# Patient Record
Sex: MALE | Race: WHITE | NOT HISPANIC OR LATINO | Employment: PART TIME | URBAN - METROPOLITAN AREA
[De-identification: names, ages, dates, MRNs, and addresses within clinical notes are randomized per-mention and may not be internally consistent; named-entity substitution may affect disease eponyms.]

---

## 2024-04-09 ENCOUNTER — HOSPITAL ENCOUNTER (EMERGENCY)
Facility: CLINIC | Age: 24
Discharge: HOME | End: 2024-04-09
Attending: EMERGENCY MEDICINE | Admitting: EMERGENCY MEDICINE

## 2024-04-09 ENCOUNTER — APPOINTMENT (OUTPATIENT)
Dept: GENERAL RADIOLOGY | Facility: CLINIC | Age: 24
End: 2024-04-09
Attending: EMERGENCY MEDICINE

## 2024-04-09 VITALS
SYSTOLIC BLOOD PRESSURE: 154 MMHG | RESPIRATION RATE: 16 BRPM | HEART RATE: 85 BPM | TEMPERATURE: 98.2 F | OXYGEN SATURATION: 98 % | DIASTOLIC BLOOD PRESSURE: 93 MMHG

## 2024-04-09 DIAGNOSIS — S93.401A SPRAIN OF RIGHT ANKLE, UNSPECIFIED LIGAMENT, INITIAL ENCOUNTER: ICD-10-CM

## 2024-04-09 PROCEDURE — 99283 EMERGENCY DEPT VISIT LOW MDM: CPT | Performed by: EMERGENCY MEDICINE

## 2024-04-09 PROCEDURE — 73610 X-RAY EXAM OF ANKLE: CPT | Mod: RT

## 2024-04-09 PROCEDURE — 73630 X-RAY EXAM OF FOOT: CPT | Mod: RT

## 2024-04-09 PROCEDURE — 73562 X-RAY EXAM OF KNEE 3: CPT | Mod: RT

## 2024-04-09 RX ORDER — CYCLOBENZAPRINE HCL 10 MG
10 TABLET ORAL 3 TIMES DAILY PRN
Qty: 20 TABLET | Refills: 0 | Status: SHIPPED | OUTPATIENT
Start: 2024-04-09 | End: 2024-04-15

## 2024-04-09 RX ORDER — CYCLOBENZAPRINE HCL 10 MG
10 TABLET ORAL 3 TIMES DAILY PRN
Qty: 10 TABLET | Refills: 0 | Status: SHIPPED | OUTPATIENT
Start: 2024-04-09

## 2024-04-09 ASSESSMENT — COLUMBIA-SUICIDE SEVERITY RATING SCALE - C-SSRS
2. HAVE YOU ACTUALLY HAD ANY THOUGHTS OF KILLING YOURSELF IN THE PAST MONTH?: NO
1. IN THE PAST MONTH, HAVE YOU WISHED YOU WERE DEAD OR WISHED YOU COULD GO TO SLEEP AND NOT WAKE UP?: NO
6. HAVE YOU EVER DONE ANYTHING, STARTED TO DO ANYTHING, OR PREPARED TO DO ANYTHING TO END YOUR LIFE?: NO

## 2024-04-09 ASSESSMENT — ACTIVITIES OF DAILY LIVING (ADL)
ADLS_ACUITY_SCORE: 33
ADLS_ACUITY_SCORE: 35
ADLS_ACUITY_SCORE: 35
ADLS_ACUITY_SCORE: 33

## 2024-04-09 NOTE — LETTER
April 9, 2024      To Whom It May Concern:      López Moore was seen in our Emergency Department today, 04/09/24.  He has an ankle sprain and may need to use crutches. He should be assigned light duty and no standing for more than 1 hour at a time, please accommodate 10-15 min breaks as needed. These restrictions are in effect for one week from today. Any further restrictions, if necessary, will need to come from his clinic.    Sincerely,        Emili Jara MD

## 2024-04-09 NOTE — ED PROVIDER NOTES
Patient was signed out to me at change of shift by Dr. Lr, please see her note for full details.  Briefly, at change of shift, we were pending right foot x-ray, right ankle x-ray, and right knee x-ray.    These x-rays were read by radiology as negative for fracture.  Patient was given a gel splint and crutches for ankle sprain as he is having difficulty bearing weight.  Discussed rest, ice, elevation, NSAIDs.  Dr. Lr also provided a prescription for Flexeril due to some muscle spasm/pain that he was having.  Typical precautions discussed.  Patient verbalizes understanding.     Emili Jara MD  04/09/24 [id number]

## 2024-04-09 NOTE — DISCHARGE INSTRUCTIONS
TODAY'S VISIT:  You were seen today for ankle pain   - The radiologist has looked at the X rays and there is no sign of any broken bones. You have an ankle sprain. We recommend ice, elevation, and continued use of motrin. You can use the muscle relaxer as needed for muscle pain. This medicine can make you sleepy, so you cannot drink alcohol or drive while on this medicine.   - If you had any labs or imaging/radiology tests performed today, you should also discuss these tests with your usual provider.     FOLLOW-UP:  Please make an appointment to follow up with:  - Your Primary Care Provider. If you do not have a PCP, please call the [facility] (phone: [phone] for an appointment   - Please make an appointment to follow up with Orthopedics Clinic (phone: [phone]) if symptoms persist    - Have your provider review the results from today's visit with you again to make sure no further follow-up or additional testing is needed based on those results.     RETURN TO THE EMERGENCY DEPARTMENT  Return to the Emergency Department at any time for any new or worsening symptoms or any concerns.

## 2024-04-09 NOTE — ED PROVIDER NOTES
History     Chief Complaint   Patient presents with    Ankle Pain     Onset 2 days ago stepped off a curb twisted right ankle, increased swelling, continues to have pain with movement.     HPI  López Moore is a 23 year old otherwise healthy male who presents to the emergency department with a chief complaint of right ankle pain.  The patient states 2 days ago he stepped off a curb and twisted his right ankle (inverting it).  Since then, he has had pain to the lateral and posterior aspects of his ankle, these worsen when he attempts to ambulate.  He has been able to ambulate, but feels as if he cannot put his full weight on the affected extremity.  He also reports some associated knee and posterior calf discomfort with this.  Distal sensation is intact in his foot, though movement is somewhat limited due to swelling.  No other associated injuries reported.  He is not on blood thinners.  The patient does note he has a previous meniscus injury on the right.    The patient has been using Tylenol/ibuprofen at home for pain control as well as resting and elevating his foot is much as possible.    I have reviewed the Medications, Allergies, Past Medical and Surgical History, and Social History in the Epic system.    No past medical history on file.  No past surgical history on file.  No current facility-administered medications for this encounter.     No current outpatient medications on file.     No Known Allergies  Past medical history, past surgical history, medications, and allergies were reviewed with the patient. Additional pertinent items: None    Social History     Socioeconomic History    Marital status: Single     Spouse name: Not on file    Number of children: Not on file    Years of education: Not on file    Highest education level: Not on file   Occupational History    Not on file   Tobacco Use    Smoking status: Not on file    Smokeless tobacco: Not on file   Substance and Sexual Activity    Alcohol  use: Not on file    Drug use: Not on file    Sexual activity: Not on file   Other Topics Concern    Not on file   Social History Narrative    Not on file     Social Determinants of Health     Financial Resource Strain: Not on file   Food Insecurity: Not on file   Transportation Needs: Not on file   Physical Activity: Not on file   Stress: Not on file   Social Connections: Not on file   Interpersonal Safety: Not on file   Housing Stability: Not on file     Social history was reviewed with the patient. Additional pertinent items: None    Review of Systems  A medically appropriate review of systems was performed with pertinent positives and negatives noted in the HPI, and all other systems negative.    Physical Exam   BP: (!) 154/93  Pulse: 85  Temp: 98.2  F (36.8  C)  Resp: 16  SpO2: 98 %      General: Well nourished, well developed, NAD  HEENT: EOMI, anicteric. NCAT, MMM  Neck: no jugular venous distension, supple, nl ROM  Cardiac: Regular rate and rhythm.  Normal capillary refill intact peripheral pulses  Pulm: NLB, normal RR  Skin: Warm and dry to the touch.  No rash  Extremities: Swelling and tenderness to right ankle, particularly over the lateral malleolus and Achilles tendon, range of motion is significantly limited due to pain/swelling, however, patient is able to slightly dorsiflex and plantarflex his foot/resist against attempts to dorsiflex/plantarflex his foot, as well as wiggle all 5 of his toes, distally neurovascularly intact  Neuro: A&Ox3, no gross focal deficits    ED Course        Procedures                        Labs Ordered and Resulted from Time of ED Arrival to Time of ED Departure - No data to display         No results found for this or any previous visit (from the past 24 hour(s)).    Labs, vital signs, and imaging studies were reviewed by me.    Medications - No data to display    Assessments & Plan (with Medical Decision Making)   López Moore is a 23 year old male who Zentz to the  emergency department right ankle pain.  Differential diagnosis includes fracture, sprain, contusion.  X-rays ordered to further evaluate the patient in the emergency department.    Critical care was not performed.     Medical Decision Making  The patient's presentation was of low complexity (an acute and uncomplicated illness or injury).    The patient's evaluation involved:  ordering and/or review of 3+ test(s) in this encounter (see separate area of note for details)  independent interpretation of testing performed by another health professional (x-ray)    The patient's management necessitated moderate risk (prescription drug management including medications given in the ED).    I have reviewed the nursing notes.    I have reviewed the findings, diagnosis, plan and need for follow up with the patient.    Patient to be signed out to oncoming provider.  X-rays pending at this time.  After these are resulted, plan for patient to be discharged home with walking boot or splint as appropriate. Advised to follow up with PCP within 1 week.  Referral to orthopedic surgery also provided for patient to follow-up with them as needed (if fracture identified on x-ray or patient's symptoms do not improve as expected).  Patient to be instructed to return to ER immediately with any new/worsening symptoms.      New Prescriptions    CYCLOBENZAPRINE (FLEXERIL) 10 MG TABLET    Take 1 tablet (10 mg) by mouth 3 times daily as needed       Final diagnoses:   Sprain of right ankle, unspecified ligament, initial encounter       LYNNE BANERJEE MD  4/9/2024   [facility] EMERGENCY DEPARTMENT       Lynne Banerjee MD  04/09/24 [id number]